# Patient Record
Sex: MALE | Race: WHITE | Employment: FULL TIME | URBAN - METROPOLITAN AREA
[De-identification: names, ages, dates, MRNs, and addresses within clinical notes are randomized per-mention and may not be internally consistent; named-entity substitution may affect disease eponyms.]

---

## 2018-03-04 ENCOUNTER — HOSPITAL ENCOUNTER (OUTPATIENT)
Age: 38
Discharge: HOME | End: 2018-03-04
Attending: FAMILY MEDICINE
Payer: COMMERCIAL

## 2018-03-04 VITALS
TEMPERATURE: 98 F | HEIGHT: 72 IN | OXYGEN SATURATION: 98 % | WEIGHT: 245 LBS | BODY MASS INDEX: 33.18 KG/M2 | RESPIRATION RATE: 20 BRPM | SYSTOLIC BLOOD PRESSURE: 136 MMHG | HEART RATE: 76 BPM | DIASTOLIC BLOOD PRESSURE: 84 MMHG

## 2018-03-04 DIAGNOSIS — H61.23 EXCESSIVE CERUMEN IN BOTH EAR CANALS: Primary | ICD-10-CM

## 2018-03-04 PROCEDURE — 99202 OFFICE O/P NEW SF 15 MIN: CPT

## 2018-03-04 RX ORDER — VALSARTAN 40 MG/1
TABLET ORAL DAILY
COMMUNITY

## 2018-03-04 RX ORDER — OMEPRAZOLE 20 MG/1
20 CAPSULE, DELAYED RELEASE ORAL
COMMUNITY

## 2018-03-04 NOTE — ED PROVIDER NOTES
Patient Seen in: [id number]    History   CC:  Patient presents with:  Ear Problem Pain (neurosensory)    Stated Complaint: left ear pain     ------------------------------  Per Rn:     Pt presents to the IC with c/o left normal, min drainage, clear, no sinus tenderness   Throat:   mild hyperemia, no exudate;    Neck:   Supple, symmetrical, trachea midline, no adenopathy;     thyroid:  no enlargement/tenderness/nodules; no carotid    bruit or JVD   Heart   S1 S2 w/RRR   [place] [employer]

## 2018-03-04 NOTE — ED INITIAL ASSESSMENT (HPI)
Pt presents to the IC with c/o left ear pain and pressure since flying multiple times from Monday to Wednesday this week. Pt notes intermittent pain with decreased hearing from the left. Pt attempted to use debrox yesterday without improvement.